# Patient Record
Sex: MALE | Race: WHITE | NOT HISPANIC OR LATINO | URBAN - METROPOLITAN AREA
[De-identification: names, ages, dates, MRNs, and addresses within clinical notes are randomized per-mention and may not be internally consistent; named-entity substitution may affect disease eponyms.]

---

## 2020-01-10 ENCOUNTER — EMERGENCY (EMERGENCY)
Facility: HOSPITAL | Age: 30
LOS: 0 days | Discharge: HOME | End: 2020-01-11
Attending: EMERGENCY MEDICINE
Payer: SELF-PAY

## 2020-01-10 VITALS — WEIGHT: 179.9 LBS | HEIGHT: 77 IN

## 2020-01-10 DIAGNOSIS — F17.210 NICOTINE DEPENDENCE, CIGARETTES, UNCOMPLICATED: ICD-10-CM

## 2020-01-10 DIAGNOSIS — R00.2 PALPITATIONS: ICD-10-CM

## 2020-01-10 DIAGNOSIS — K21.9 GASTRO-ESOPHAGEAL REFLUX DISEASE WITHOUT ESOPHAGITIS: ICD-10-CM

## 2020-01-10 LAB
ALBUMIN SERPL ELPH-MCNC: 3.8 G/DL — SIGNIFICANT CHANGE UP (ref 3.3–5)
ALP SERPL-CCNC: 95 U/L — SIGNIFICANT CHANGE UP (ref 40–120)
ALT FLD-CCNC: 24 U/L — SIGNIFICANT CHANGE UP (ref 12–78)
ANION GAP SERPL CALC-SCNC: 5 MMOL/L — SIGNIFICANT CHANGE UP (ref 5–17)
APTT BLD: 31.1 SEC — SIGNIFICANT CHANGE UP (ref 27.5–36.3)
AST SERPL-CCNC: 20 U/L — SIGNIFICANT CHANGE UP (ref 15–37)
BILIRUB SERPL-MCNC: 0.3 MG/DL — SIGNIFICANT CHANGE UP (ref 0.2–1.2)
BUN SERPL-MCNC: 17 MG/DL — SIGNIFICANT CHANGE UP (ref 7–23)
CALCIUM SERPL-MCNC: 9.3 MG/DL — SIGNIFICANT CHANGE UP (ref 8.5–10.1)
CHLORIDE SERPL-SCNC: 111 MMOL/L — HIGH (ref 96–108)
CO2 SERPL-SCNC: 26 MMOL/L — SIGNIFICANT CHANGE UP (ref 22–31)
CREAT SERPL-MCNC: 1.15 MG/DL — SIGNIFICANT CHANGE UP (ref 0.5–1.3)
GLUCOSE SERPL-MCNC: 77 MG/DL — SIGNIFICANT CHANGE UP (ref 70–99)
HCT VFR BLD CALC: 43.4 % — SIGNIFICANT CHANGE UP (ref 39–50)
HGB BLD-MCNC: 14.3 G/DL — SIGNIFICANT CHANGE UP (ref 13–17)
INR BLD: 1.1 RATIO — SIGNIFICANT CHANGE UP (ref 0.88–1.16)
LIDOCAIN IGE QN: 99 U/L — SIGNIFICANT CHANGE UP (ref 73–393)
MCHC RBC-ENTMCNC: 30.1 PG — SIGNIFICANT CHANGE UP (ref 27–34)
MCHC RBC-ENTMCNC: 32.9 GM/DL — SIGNIFICANT CHANGE UP (ref 32–36)
MCV RBC AUTO: 91.4 FL — SIGNIFICANT CHANGE UP (ref 80–100)
PCP SPEC-MCNC: SIGNIFICANT CHANGE UP
PLATELET # BLD AUTO: 161 K/UL — SIGNIFICANT CHANGE UP (ref 150–400)
POTASSIUM SERPL-MCNC: 3.9 MMOL/L — SIGNIFICANT CHANGE UP (ref 3.5–5.3)
POTASSIUM SERPL-SCNC: 3.9 MMOL/L — SIGNIFICANT CHANGE UP (ref 3.5–5.3)
PROT SERPL-MCNC: 6.7 GM/DL — SIGNIFICANT CHANGE UP (ref 6–8.3)
PROTHROM AB SERPL-ACNC: 12.2 SEC — SIGNIFICANT CHANGE UP (ref 10–12.9)
RBC # BLD: 4.75 M/UL — SIGNIFICANT CHANGE UP (ref 4.2–5.8)
RBC # FLD: 13.3 % — SIGNIFICANT CHANGE UP (ref 10.3–14.5)
SODIUM SERPL-SCNC: 142 MMOL/L — SIGNIFICANT CHANGE UP (ref 135–145)
TROPONIN I SERPL-MCNC: <0.015 NG/ML — SIGNIFICANT CHANGE UP (ref 0.01–0.04)
WBC # BLD: 9.9 K/UL — SIGNIFICANT CHANGE UP (ref 3.8–10.5)
WBC # FLD AUTO: 9.9 K/UL — SIGNIFICANT CHANGE UP (ref 3.8–10.5)

## 2020-01-10 PROCEDURE — 85730 THROMBOPLASTIN TIME PARTIAL: CPT

## 2020-01-10 PROCEDURE — 93005 ELECTROCARDIOGRAM TRACING: CPT

## 2020-01-10 PROCEDURE — 93010 ELECTROCARDIOGRAM REPORT: CPT

## 2020-01-10 PROCEDURE — 80307 DRUG TEST PRSMV CHEM ANLYZR: CPT

## 2020-01-10 PROCEDURE — 36415 COLL VENOUS BLD VENIPUNCTURE: CPT

## 2020-01-10 PROCEDURE — 85610 PROTHROMBIN TIME: CPT

## 2020-01-10 PROCEDURE — 83690 ASSAY OF LIPASE: CPT

## 2020-01-10 PROCEDURE — 84484 ASSAY OF TROPONIN QUANT: CPT

## 2020-01-10 PROCEDURE — 71046 X-RAY EXAM CHEST 2 VIEWS: CPT

## 2020-01-10 PROCEDURE — 99283 EMERGENCY DEPT VISIT LOW MDM: CPT

## 2020-01-10 PROCEDURE — 80053 COMPREHEN METABOLIC PANEL: CPT

## 2020-01-10 PROCEDURE — 85027 COMPLETE CBC AUTOMATED: CPT

## 2020-01-10 PROCEDURE — 99053 MED SERV 10PM-8AM 24 HR FAC: CPT

## 2020-01-10 PROCEDURE — 71046 X-RAY EXAM CHEST 2 VIEWS: CPT | Mod: 26

## 2020-01-10 PROCEDURE — 99283 EMERGENCY DEPT VISIT LOW MDM: CPT | Mod: 25

## 2020-01-10 RX ORDER — SODIUM CHLORIDE 9 MG/ML
1000 INJECTION INTRAMUSCULAR; INTRAVENOUS; SUBCUTANEOUS ONCE
Refills: 0 | Status: COMPLETED | OUTPATIENT
Start: 2020-01-10 | End: 2020-01-10

## 2020-01-10 RX ADMIN — SODIUM CHLORIDE 1000 MILLILITER(S): 9 INJECTION INTRAMUSCULAR; INTRAVENOUS; SUBCUTANEOUS at 23:30

## 2020-01-10 NOTE — ED ADULT TRIAGE NOTE - WEIGHT IN LBS
HOME MONITORING REPORT    INR today:   Results for orders placed or performed in visit on 07/25/18   Protime-INR   Result Value Ref Range    INR 2.80        INR Goal: 2.0-3.0    Dosing Plan  As of 7/25/2018    TTR:   88.7 % (2 mo)   Full warfarin instructions:   5 mg, then 2.5 mg repeating every 2 days              PLAN:PATIENT NOTIFIED TO  CONTINUE CURRENT DOSE AND RECHECK IN ONE WEEK. 179.8

## 2020-01-10 NOTE — ED ADULT TRIAGE NOTE - CHIEF COMPLAINT QUOTE
pt presents to Ed with complaints of palpitations. pt states symptoms began approximately 3 hrs PTA.

## 2020-01-11 VITALS
OXYGEN SATURATION: 99 % | DIASTOLIC BLOOD PRESSURE: 87 MMHG | RESPIRATION RATE: 17 BRPM | HEART RATE: 72 BPM | SYSTOLIC BLOOD PRESSURE: 133 MMHG | TEMPERATURE: 98 F

## 2020-01-11 NOTE — ED PROVIDER NOTE - OBJECTIVE STATEMENT
28 y/o male in ED c/o palpitations x years intermittent but worse today.   pt states has had 2 full cardiac w/u in the past that were normal.   pt states increase stress this week.   pt denies any fever, HA, cp, sob, n/v/d/abd pain.   tolerating PO.    no sick contacts or recent travel.

## 2020-01-11 NOTE — ED PROVIDER NOTE - PATIENT PORTAL LINK FT
You can access the FollowMyHealth Patient Portal offered by [facility] by registering at the following website: [email or web address]. By joining [employer]’s FollowMyHealth portal, you will also be able to view your health information using other applications (apps) compatible with our system.

## 2020-01-11 NOTE — ED ADULT NURSE NOTE - CHPI ED NUR SYMPTOMS NEG
no diaphoresis/no chills/no fever/no chest pain/no dizziness/no back pain/no syncope/no congestion/no nausea/no shortness of breath/no vomiting

## 2020-01-11 NOTE — ED ADULT NURSE NOTE - OBJECTIVE STATEMENT
pt c/o palpitations that began 3 hours PTA. pt endorses drinking 2 shots and 4 beers tonight @11pm. pt endorses drinking coffee today. pt denies chest pain/SOB/dizziness. on monitor throughout ER visit. pt dc to home, dc instructions given, pt verbalized understanding of dc. ambulated out of ER

## 2020-01-11 NOTE — ED PROVIDER NOTE - NSFOLLOWUPINSTRUCTIONS_ED_ALL_ED_FT
please follow up with your doctor in 2-3 days.   drink plenty of fluids.   stop smoking.    return to ED for any concerns

## 2023-03-24 ENCOUNTER — EMERGENCY (EMERGENCY)
Facility: HOSPITAL | Age: 33
LOS: 0 days | Discharge: HOME | End: 2023-03-25
Attending: EMERGENCY MEDICINE
Payer: OTHER MISCELLANEOUS

## 2023-03-24 VITALS
SYSTOLIC BLOOD PRESSURE: 148 MMHG | OXYGEN SATURATION: 100 % | RESPIRATION RATE: 18 BRPM | HEART RATE: 96 BPM | DIASTOLIC BLOOD PRESSURE: 95 MMHG | TEMPERATURE: 99 F

## 2023-03-24 VITALS — HEIGHT: 76 IN | WEIGHT: 179.9 LBS
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
## 2023-03-24 DIAGNOSIS — S99.912A UNSPECIFIED INJURY OF LEFT ANKLE, INITIAL ENCOUNTER: ICD-10-CM

## 2023-03-24 DIAGNOSIS — S99.922A UNSPECIFIED INJURY OF LEFT FOOT, INITIAL ENCOUNTER: ICD-10-CM
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
## 2023-03-24 DIAGNOSIS — M25.572 PAIN IN LEFT ANKLE AND JOINTS OF LEFT FOOT: ICD-10-CM

## 2023-03-24 DIAGNOSIS — K21.00 GASTRO-ESOPHAGEAL REFLUX DISEASE WITH ESOPHAGITIS, WITHOUT BLEEDING: ICD-10-CM

## 2023-03-24 PROCEDURE — 76376 3D RENDER W/INTRP POSTPROCES: CPT | Mod: 26

## 2023-03-24 PROCEDURE — 73610 X-RAY EXAM OF ANKLE: CPT | Mod: 26,LT

## 2023-03-24 PROCEDURE — 73630 X-RAY EXAM OF FOOT: CPT | Mod: LT

## 2023-03-24 PROCEDURE — 73630 X-RAY EXAM OF FOOT: CPT | Mod: 26,LT,77

## 2023-03-24 PROCEDURE — 99285 EMERGENCY DEPT VISIT HI MDM: CPT

## 2023-03-24 PROCEDURE — G1004: CPT

## 2023-03-24 PROCEDURE — 73590 X-RAY EXAM OF LOWER LEG: CPT | Mod: 26,LT

## 2023-03-24 PROCEDURE — 73700 CT LOWER EXTREMITY W/O DYE: CPT | Mod: MG,LT

## 2023-03-24 PROCEDURE — 76376 3D RENDER W/INTRP POSTPROCES: CPT

## 2023-03-24 PROCEDURE — 73590 X-RAY EXAM OF LOWER LEG: CPT | Mod: LT

## 2023-03-24 PROCEDURE — 73700 CT LOWER EXTREMITY W/O DYE: CPT | Mod: 26,LT,MG

## 2023-03-24 PROCEDURE — 73610 X-RAY EXAM OF ANKLE: CPT | Mod: LT

## 2023-03-24 PROCEDURE — 99284 EMERGENCY DEPT VISIT MOD MDM: CPT | Mod: 25

## 2023-03-24 PROCEDURE — 73610 X-RAY EXAM OF ANKLE: CPT | Mod: 26,LT,77

## 2023-03-24 PROCEDURE — 73630 X-RAY EXAM OF FOOT: CPT | Mod: 26,LT

## 2023-03-24 RX ORDER — OXYCODONE AND ACETAMINOPHEN 5; 325 MG/1; MG/1
1 TABLET ORAL ONCE
Refills: 0 | Status: DISCONTINUED | OUTPATIENT
Start: 2023-03-24 | End: 2023-03-24

## 2023-03-24 RX ADMIN — OXYCODONE AND ACETAMINOPHEN 1 TABLET(S): 5; 325 TABLET ORAL at 20:49

## 2023-03-24 NOTE — ED STATDOCS - NS ED ATTENDING STATEMENT MOD
This was a shared visit with the NIRMAL. I reviewed and verified the documentation and independently performed the documented:

## 2023-03-24 NOTE — ED STATDOCS - CLINICAL SUMMARY MEDICAL DECISION MAKING FREE TEXT BOX
Plan: Xr, pain meds. pt s/p left ankle trauma when foot went into pothole.   visible deformity and swelling.   Plan: Xr, pain meds.

## 2023-03-24 NOTE — ED STATDOCS - CARE PROVIDER_API CALL
Flaquito Roe (DO)  Orthopaedic Surgery  [address] [place] [address]  Phone: [phone]  Fax: [phone]  Follow Up Time:

## 2023-03-24 NOTE — ED STATDOCS - NSFOLLOWUPINSTRUCTIONS_ED_ALL_ED_FT
Ankle Fracture       The ankle joint is made up of the lower (distal) sections of the lower leg bones, called the tibia and fibula, along with a bone in the foot called the talus. An ankle fracture is a break in one, two, or all three of these sections of bone. There are two general types of ankle fractures:  •Stable fracture. This happens when one of the bones is broken, but the bones of the ankle joint stay in their normal positions.      •Unstable fracture. This type can include more than one broken bone. It can also happen if the outer bone is broken and the strong tissues that connect bones to each other (ligaments) are also injured at the inner ankle. This type of fracture allows the talus to move out of its normal position.        What are the causes?    This condition may be caused by:  •A hard, direct hit to the ankle.      •Quickly and severely twisting your ankle, often while your foot is planted and the rest of your body is moving.      •Trauma, such as from a car crash or a fall from a height.        What increases the risk?    The following factors may make you more likely to develop this condition:  •Being overweight.      •Participating in sports that involve quick direction changes, as in soccer.      •Doing high-impact sports such as gymnastics or football.        What are the signs or symptoms?     Symptoms of this condition include:  •A tender and swollen ankle.      •Bruising around your injured ankle.      •Pain when moving or pressing on your ankle.      •Trouble walking or using your ankle to support your body weight (putting weight on your ankle).      •Pain that gets worse when you move your foot or ankle or when you stand.      •Pain that gets better with rest.        How is this diagnosed?    An ankle fracture is usually diagnosed with a physical exam and X-rays. You may also have a CT scan or an MRI.      How is this treated?    Treatment for this condition depends on the type of ankle fracture you have. Stable fractures are treated with a cast, boot, or splint to hold the ankle still and crutches to avoid putting weight on the ankle until the fracture heals. Unstable fractures require surgery to ensure that the bones heal properly. After surgery, you will have a splint. After your incision has healed, your surgeon may give you a cast or a boot. You will not be able to put weight on your injured side for several weeks.    After your ankle has healed, you will do physical therapy exercises to improve movement and strength in your ankle.      Follow these instructions at home:    If you have a boot or splint:     •Wear the boot or splint as told by your health care provider. Remove it only as told by your health care provider.      •Loosen it if your toes tingle, become numb, or turn cold and blue.      •Keep it clean and dry.      If you have a cast:     • Do not put pressure on any part of the cast until it is fully hardened. This may take several hours.      • Do not stick anything inside the cast to scratch your skin. Doing that increases your risk of infection.      •Check the skin around the cast every day. Tell your health care provider about any concerns.      •You may put lotion on dry skin around the edges of the cast. Do not put lotion on the skin underneath the cast.      •Keep it clean and dry.      Bathing     • Do not take baths, swim, or use a hot tub until your health care provider approves. Ask your health care provider if you may take showers. You may only be allowed to take sponge baths.    •If the cast, boot, or splint is not waterproof:  •Do not let it get wet.      •Cover it with a watertight covering when you take a bath or shower.          Managing pain, stiffness, and swelling    •If directed, put ice on the injured area. To do this:  •If you have a removable splint or boot, remove it as told by your health care provider.      •Put ice in a plastic bag.      •Place a towel between your skin and the bag or between your cast and the bag.      •Leave the ice on for 20 minutes, 2–3 times a day.      •Remove the ice if your skin turns bright red. This is very important. If you cannot feel pain, heat, or cold, you have a greater risk of damage to the area.        •Move your toes often to reduce stiffness and swelling.      •Raise (elevate) the injured area above the level of your heart while you are sitting or lying down.      Activity     •Do exercises as told by your health care provider.      •Return to your normal activities as told by your health care provider. Ask your health care provider what activities are safe for you.      • Do not use the injured limb to support your body weight until your health care provider says that you can. Use crutches as told by your health care provider.      General instructions     •Take over-the-counter and prescription medicines only as told by your health care provider.      •Ask your health care provider when it is safe to drive if you have a cast, boot, or splint on your ankle.      • Do not use any products that contain nicotine or tobacco, such as cigarettes, e-cigarettes, and chewing tobacco. These can delay bone healing. If you need help quitting, ask your health care provider.      •Keep all follow-up visits. This is important.        Contact a health care provider if:    •You have pain or swelling that gets worse or does not get better with rest or medicine.      •Your cast gets damaged.        Get help right away if:    •You have severe pain that lasts.      •You develop new pain or swelling.      •Your skin or toenails below the injury turn blue or gray, feel cold, become numb, or are less sensitive to the touch.        Summary    •An ankle fracture can be stable or unstable. This is determined after a physical exam and imaging studies such as X-rays, a CT scan, or an MRI.      •Stable fractures are treated with a cast, boot, or splint to hold the ankle still until the fracture heals. Unstable fractures require surgery to ensure that the bones heal properly.      •You will not be able to put weight on your injured side for several weeks.      •Medicines, icing, and raising (elevating) your injured ankle when you are sitting or lying down may help with pain relief. Follow instructions as told by your health care provider.      This information is not intended to replace advice given to you by your health care provider. Make sure you discuss any questions you have with your health care provider.

## 2023-03-24 NOTE — ED STATDOCS - OBJECTIVE STATEMENT
34 y/o M with a pMhx of GERD with esophagitis presents to the ED c/o L ankle pain and swelling. pt is a [employer] worker that stepped out of truck into a pothole. Inverted L foot, c/o pain, swelling, and inability to bear weight x 6 hrs. No pain meds taken, able to feel and able to move toes. Denies any other complaints.

## 2023-03-24 NOTE — ED STATDOCS - ATTENDING APP SHARED VISIT CONTRIBUTION OF CARE
I, Lexx Adan, performed the initial face to face bedside interview with this patient regarding history of present illness, review of symptoms and relevant past medical, social and family history.  I completed an independent physical examination.  I was the initial provider who evaluated this patient. I have signed out the follow up of any pending tests (i.e. labs, radiological studies) to the NIRMAL.  I have communicated the patient’s plan of care and disposition with the NIRMAL.  The history, relevant review of systems, past medical and surgical history, medical decision making, and physical examination was documented by the scribe in my presence and I attest to the accuracy of the documentation.     pt with left ankle trauma.  states stepped off [employer] truck into pothole leading to left foot inversion.   visible deformity and swelling to left ankle.    NVID.   will XR, meds and reeval

## 2023-03-24 NOTE — ED ADULT NURSE NOTE - OBJECTIVE STATEMENT
Pt presents to ED for a left ankle injury. States he was at work and fell off of a loading dock, falling about six feet and landing into an approx 8" drainage ditch, inverting left ankle. Ankle swollen with obvious deformity. skin warm and dry. +DP/PT pulses. xrays performed and percocet given. will continue to monitor awaiting treatment, dispo Pt presents to ED for a left ankle injury. States he was at work and fell off of a loading dock, falling about six feet and landing into an approx 8" drainage ditch, inverting left ankle. Ankle swollen with obvious deformity. skin warm and dry. +PT pulses, cap refill <2sec, unable to assess PT pulses due to swelling and pain. xrays performed and percocet given. will continue to monitor awaiting treatment, dispo

## 2023-03-26 PROBLEM — K21.9 GASTRO-ESOPHAGEAL REFLUX DISEASE WITHOUT ESOPHAGITIS: Chronic | Status: ACTIVE | Noted: 2020-01-11

## 2023-03-31 ENCOUNTER — NON-APPOINTMENT (OUTPATIENT)
Age: 33
End: 2023-03-31

## 2023-03-31 ENCOUNTER — APPOINTMENT (OUTPATIENT)
Dept: ORTHOPEDIC SURGERY | Facility: CLINIC | Age: 33
End: 2023-03-31
Payer: OTHER MISCELLANEOUS

## 2023-03-31 VITALS — WEIGHT: 180 LBS | HEIGHT: 76 IN | BODY MASS INDEX: 21.92 KG/M2

## 2023-03-31 PROCEDURE — 99204 OFFICE O/P NEW MOD 45 MIN: CPT

## 2023-03-31 PROCEDURE — 27760 CLTX MEDIAL ANKLE FX: CPT | Mod: LT

## 2023-03-31 PROCEDURE — 28430 CLTX TALUS FRACTURE W/O MNPJ: CPT | Mod: LT

## 2023-04-05 NOTE — ADDENDUM
[FreeTextEntry1] : I, Martin Sims, acted solely as a scribe for Dr. Flaquito Roe DO on this date 03/31/2023  .\par  \par All medical record entries made by the Scribe were at my, Dr. Flaquito Roe DO, direction and personally dictated by me on 03/31/2023 . I have reviewed the chart and agree that the record accurately reflects my personal performance of the history, physical exam, assessment and plan. I have also personally directed, reviewed, and agreed with the chart.

## 2023-04-05 NOTE — PHYSICAL EXAM
[de-identified] : General: Alert and oriented x3. In no acute distress. Pleasant in nature with a normal affect. No apparent respiratory distress.\par \par Limited due to injury - diffuse swelling, ecchymosis medial malleolus.\par \par Left Ankle Exam\par Skin: Clean, dry, intact\par Inspection: No obvious malalignment, no swelling, no effusion; no lymphadenopathy\par Pulses: 2+ DP/PT pulses\par ROM: 10 degrees of dorsiflexion, 40 degrees of plantarflexion, 35 degrees of Eversion, 35 degrees of Inversion; 10 degrees of subtalar motion.\par Tenderness: No tenderness over the lateral malleolus, no CFL/no ATFL/no PTFL pain. No medial malleolus pain, no deltoid ligament pain. No proximal fibular pain. No heel pain.\par Stability: Negative anterior/posterior drawer.\par Strength: 5/5 TA/GS/EHL\par Neuro: In tact to light touch throughout\par Additional tests: Negative Mortons test, Negative syndesmosis squeeze test. Negative Martha's Sign. [de-identified] : ACC: [id number] EXAM: CT 3D RECONSTRUCT WO CHRISTIANA ORDERED BY: BEBA CHONG\par \par ACC: [id number] EXAM: CT ANKLE ONLY LT ORDERED BY: BEBA CHONG\par \par PROCEDURE DATE: 03/24/2023\par \par \par INTERPRETATION: CLINICAL INFORMATION: Trauma and twisted ankle.\par \par TECHNIQUE: CT of the left ankle was performed in bone and soft tissue windows with coronal and sagittal reformats. No intravenous contrast was administered. 3-D reformats were performed on a separate workstation.\par \par COMPARISON: No similar prior studies are available for comparison.\par \par FINDINGS:\par \par Bone: Acute intra-articular avulsion fractures of the anterolateral aspect of the medial malleolus is seen with mild displacement of some fracture fragments. Acute mildly displaced avulsion fractures are noted emanating from the anterior aspect of the medial tibial plafond. An acute comminuted, intra-articular fracture of the medial aspect of the talus is seen with mild lateral depression of the fracture fragments and fracture extension to the medial aspect of the posterior subtalar joint. An acute nondisplaced intra-articular fracture of the inferomedial aspect of the cuboid is noted. No additional fracture or dislocation is demonstrated.\par \par Soft tissues: Marked soft tissue swelling and subcutaneous inflammatory change is seen in the lateral ankle extending into the distal lower leg and lateral hindfoot and midfoot. Mild soft tissue swelling and subcutaneous inflammatory change is noted in the anterior ankle and medial hindfoot.\par \par IMPRESSION:\par \par 1. Acute mildly displaced avulsion fractures of the anterolateral aspect of the left medial malleolus and anterior aspect of the medial left tibial plafond.\par 2. Acute comminuted and depressed, intra-articular fracture of the medial aspect of the left talus with fracture extension to the medial aspect of the posterior subtalar joint.\par 3. Acute nondisplaced intra-articular fracture of the inferomedial aspect of the left cuboid.\par \par --- End of Report ---\par \par CURTIS LINDQUIST MD; Attending Radiologist\par This document has been electronically signed. Mar 24 2023 10:27PM

## 2023-04-05 NOTE — HISTORY OF PRESENT ILLNESS
[FreeTextEntry1] : Patient is a 33 year old, right hand dominant male who presents in office status post WRA left ankle on 3/24/2023. The patient works for [employer] and a . The patient states that he fell off a loading dock about 3 feet high. As he was going up the dock, a [place] lunged at his face. As the dog lunged at his face he let go and rolled his left ankle. He went to  ER that night where he obtained XR imaging. He was discharged that same day. He endorses global ankle swelling and pain. He does have some numbness in his toes. He presents in a splint and is ambulating without assistance. No other complaint.\par \par Driving: Not currently driving\par Working: he is currently out of work at this time.\par \par Pain scale: 8/10\par \par Activities that make the pain better:\par Rest\par Ice\par Heat\par \par Activities that make pain worse:\par ADL's\par Lifting heavy objects\par Chores\par Work\par Sleeping - lying down\par Seating to standing position\par Walking\par Stairs

## 2023-04-05 NOTE — DISCUSSION/SUMMARY
[de-identified] : Today I had a lengthy discussion with the patient regarding their right ankle pain. I have addressed all the patient's concerns surrounding the pathology of their condition. CT results interpreted by me and reviewed with the patient. At this time, we will proceed with conservative treatment. \par \par I recommended that the patient utilize a CAM boot, non weightbearing, crutches. The patient was fitted for the CAM boot in the office today. The patient was educated about the boot wear pattern and utilization, as well as the timeframe to come out of the boot. He was also given full instructions for using the boot. I advised the patient to utilize 325 mg of Aspirin as instructed for blood thinning purposes. The prescription for the Aspirin was provided for the patient in the office today. I recommend that the patient utilize ice, heat, NSAIDs prn. They can also elevate their right ankle above the level of the heart. I encouraged the patient to quit smoking and educated them about the risks that smoking has on bone healing. \par \par I would like to see the patient back in the office in 4 -6 weeks to reassess their condition. \par \par Patient is 100% temporarily disabled out of work until further evaluation. \par \par The patient understood and verbally agreed to the treatment plan. All of their questions were answered and they were satisfied with the visit. The patient should call the office if they have any questions or experience worsening symptoms.

## 2023-04-21 ENCOUNTER — APPOINTMENT (OUTPATIENT)
Dept: ORTHOPEDIC SURGERY | Facility: CLINIC | Age: 33
End: 2023-04-21
Payer: OTHER MISCELLANEOUS

## 2023-04-21 PROCEDURE — 99024 POSTOP FOLLOW-UP VISIT: CPT

## 2023-04-21 PROCEDURE — 73610 X-RAY EXAM OF ANKLE: CPT | Mod: LT

## 2023-04-21 NOTE — PHYSICAL EXAM
[de-identified] : General: Alert and oriented x3. In no acute distress. Pleasant in nature with a normal affect. No apparent respiratory distress.\par \par Limited due to injury - diffuse swelling, ecchymosis medial malleolus.\par \par Left Ankle Exam\par Skin: Clean, dry, intact\par Inspection: No obvious malalignment, + swelling, no effusion; no lymphadenopathy\par Pulses: 2+ DP/PT pulses\par ROM: 10 degrees of dorsiflexion, 40 degrees of plantarflexion, 35 degrees of Eversion, 35 degrees of Inversion; 10 degrees of subtalar motion.\par Tenderness: No tenderness over the lateral malleolus, no CFL/no ATFL/no PTFL pain. + medial malleolus pain, no deltoid ligament pain. No proximal fibular pain. No heel pain.\par Stability: Negative anterior/posterior drawer.\par Strength: 5/5 TA/GS/EHL\par Neuro: In tact to light touch throughout\par Additional tests: Negative Mortons test, Negative syndesmosis squeeze test. Negative Martha's Sign.\par \par *Positive pain anterior joint line of the ankle tibiotalar joint and pain along the medial talus to palpation. [de-identified] : ACC: [id number] EXAM: CT 3D RECONSTRUCT WO [place]RISTIANA ORDERED BY: BEBA CHONG\par \par ACC: [id number] EXAM: CT ANKLE ONLY LT ORDERED BY: BEBA CHONG\par \par PROCEDURE DATE: 03/24/2023\par \par \par INTERPRETATION: CLINICAL INFORMATION: Trauma and twisted ankle.\par \par TECHNIQUE: CT of the left ankle was performed in bone and soft tissue windows with coronal and sagittal reformats. No intravenous contrast was administered. 3-D reformats were performed on a separate workstation.\par \par COMPARISON: No similar prior studies are available for comparison.\par \par FINDINGS:\par \par Bone: Acute intra-articular avulsion fractures of the anterolateral aspect of the medial malleolus is seen with mild displacement of some fracture fragments. Acute mildly displaced avulsion fractures are noted emanating from the anterior aspect of the medial tibial plafond. An acute comminuted, intra-articular fracture of the medial aspect of the talus is seen with mild lateral depression of the fracture fragments and fracture extension to the medial aspect of the posterior subtalar joint. An acute nondisplaced intra-articular fracture of the inferomedial aspect of the cuboid is noted. No additional fracture or dislocation is demonstrated.\par \par Soft tissues: Marked soft tissue swelling and subcutaneous inflammatory change is seen in the lateral ankle extending into the distal lower leg and lateral hindfoot and midfoot. Mild soft tissue swelling and subcutaneous inflammatory change is noted in the anterior ankle and medial hindfoot.\par \par IMPRESSION:\par \par 1. Acute mildly displaced avulsion fractures of the anterolateral aspect of the left medial malleolus and anterior aspect of the medial left tibial plafond.\par 2. Acute comminuted and depressed, intra-articular fracture of the medial aspect of the left talus with fracture extension to the medial aspect of the posterior subtalar joint.\par 3. Acute nondisplaced intra-articular fracture of the inferomedial aspect of the left cuboid.\par \par --- End of Report ---\par \par CURTIS LINDQUIST MD; Attending Radiologist\par This document has been electronically signed. Mar 24 2023 10:27PM

## 2023-04-21 NOTE — DISCUSSION/SUMMARY
[de-identified] : At this time I want the patient to start outpatient physical therapy in 1 week.  A physical therapy prescription was provided to the patient to begin physical therapy for his left ankle.  He can start to slowly come off the 2 crutches and he can now slowly weight-bear as tolerated with a long cam walking boot continuing to protect the ankle.  While in physical therapy he will start to work on full range of motion of the ankle, strength, swelling control and modalities.  He will continue with aspirin for DVT prophylaxis until he is out of the boot completely.  He will continue with RICE therapy for swelling control.  He will continue with NSAIDs and Tylenol as needed for pain.  The patient will remain 100% temporary disabled from returning to work due to his left ankle injury and fractures.  I do want to see the patient back here in 4 to 6 weeks to see if there is improvement.  Hopefully by then he is out of the boot and into an ASO brace with supportive sneakers.  If he is doing better then, consider return to work then.  All of his questions were answered and he understood and agreed to the treatment course.

## 2023-04-21 NOTE — HISTORY OF PRESENT ILLNESS
[FreeTextEntry1] : 4/21/23: The patient is here for a follow-up of his left ankle injury, work-related accident.  The patient continues to use crutches and wear his long cam boot, partial weightbearing.  He does continue to have left ankle pain but states that the pain has improved.  The patient has not started outpatient physical therapy due to the ankle injury and fracture sustained during the injury.  The patient is currently out of work due to his injury.  No other complaints.  Pain scale left ankle 4 out of 10.  Patient not currently driving at this time.\par \par 3/31/23: Patient is a 33 year old, right hand dominant male who presents in office status post WRA left ankle on 3/24/2023. The patient works for [employer] and a . The patient states that he fell off a loading dock about 3 feet high. As he was going up the dock, a [place] lunged at his face. As the dog lunged at his face he let go and rolled his left ankle. He went to  ER that night where he obtained XR imaging. He was discharged that same day. He endorses global ankle swelling and pain. He does have some numbness in his toes. He presents in a splint and is ambulating without assistance. No other complaint.\par \par Driving: Not currently driving\par Working: he is currently out of work at this time.\par \par Pain scale: 8/10\par \par Activities that make the pain better:\par Rest\par Ice\par Heat\par \par Activities that make pain worse:\par ADL's\par Lifting heavy objects\par Chores\par Work\par Sleeping - lying down\par Seating to standing position\par Walking\par Stairs

## 2023-06-01 ENCOUNTER — APPOINTMENT (OUTPATIENT)
Dept: ORTHOPEDIC SURGERY | Facility: CLINIC | Age: 33
End: 2023-06-01
Payer: OTHER MISCELLANEOUS

## 2023-06-01 ENCOUNTER — FORM ENCOUNTER (OUTPATIENT)
Age: 33
End: 2023-06-01

## 2023-06-01 PROCEDURE — 99024 POSTOP FOLLOW-UP VISIT: CPT

## 2023-06-01 PROCEDURE — 73610 X-RAY EXAM OF ANKLE: CPT | Mod: LT

## 2023-06-01 NOTE — PHYSICAL EXAM
[de-identified] : General: Alert and oriented x3. In no acute distress. Pleasant in nature with a normal affect. No apparent respiratory distress.\par \par Limited due to injury - diffuse swelling, ecchymosis medial malleolus.\par \par Left Ankle Exam\par Skin: Clean, dry, intact\par Inspection: No obvious malalignment, + swelling, no effusion; no lymphadenopathy\par Pulses: 2+ DP/PT pulses\par ROM: 10 degrees of dorsiflexion, 40 degrees of plantarflexion, 35 degrees of Eversion, 35 degrees of Inversion; 10 degrees of subtalar motion.\par Tenderness: TTP Medial talus. No tenderness over the lateral malleolus, no CFL/no ATFL/no PTFL pain. No medial malleolus pain, no deltoid ligament pain. No proximal fibular pain. No heel pain.\par Stability: Negative anterior/posterior drawer.\par Strength: 5/5 TA/GS/EHL\par Neuro: In tact to light touch throughout\par Additional tests: Negative Mortons test, Negative syndesmosis squeeze test. Negative Martha's Sign.\par \par *Positive pain anterior joint line of the ankle tibiotalar joint and pain along the medial talus to palpation. [de-identified] : 3V of the left ankle were ordered obtained and reviewed by me today, 06/01/2023 , revealed: joint space intact.

## 2023-06-01 NOTE — HISTORY OF PRESENT ILLNESS
[FreeTextEntry1] : 06/01/2023: The patient is a 33 year old male presenting to the office for a follow up evaluation of his left ankle injury, work related accident. He has been compliant with physical therapy with relief. His symptoms have mildly improved since his last visit, but he is still having soreness, stiffness and sharp pain in his ankle. He reports that he has increased pain with inversion and eversion ROM. He presents to the office in sneakers and ambulating without assistance. \par \par Patient is no working.\par Patient is driving. \par \par 4/21/23: The patient is here for a follow-up of his left ankle injury, work-related accident.  The patient continues to use crutches and wear his long cam boot, partial weightbearing.  He does continue to have left ankle pain but states that the pain has improved.  The patient has not started outpatient physical therapy due to the ankle injury and fracture sustained during the injury.  The patient is currently out of work due to his injury.  No other complaints.  Pain scale left ankle 4 out of 10.  Patient not currently driving at this time.\par \par 3/31/23: Patient is a 33 year old, right hand dominant male who presents in office status post WRA left ankle on 3/24/2023. The patient works for [employer] and a . The patient states that he fell off a loading dock about 3 feet high. As he was going up the dock, a [place]te lunged at his face. As the dog lunged at his face he let go and rolled his left ankle. He went to  [facility] that night where he obtained XR imaging. He was discharged that same day. He endorses global ankle swelling and pain. He does have some numbness in his toes. He presents in a splint and is ambulating without assistance. No other complaint.\par \par Driving: Not currently driving\par Working: he is currently out of work at this time.\par \par Pain scale: 8/10\par \par Activities that make the pain better:\par Rest\par Ice\par Heat\par \par Activities that make pain worse:\par ADL's\par Lifting heavy objects\par Chores\par Work\par Sleeping - lying down\par Seating to standing position\par Walking\par Stairs

## 2023-06-01 NOTE — ADDENDUM
[FreeTextEntry1] : I, ESTELA JHLANEY, acted solely as a scribe for Dr. Flaquito Roe on this date 06/01/2023  .\par  \par All medical record entries made by the Scribe were at my, Dr. Flaquito Roe, direction and personally dictated by me on 06/01/2023 . I have reviewed the chart and agree that the record accurately reflects my personal performance of the history, physical exam, assessment and plan. I have also personally directed, reviewed, and agreed with the chart.

## 2023-06-01 NOTE — DISCUSSION/SUMMARY
[de-identified] : Today I had a lengthy discussion with the patient regarding their left ankle work-related injury. I have addressed all the patient's concerns surrounding the pathology of their condition. I reviewed the patient's XR imaging and previous CT scan with him in great detail. He will continue with RICE therapy for swelling control. He will continue with NSAIDs and Tylenol as needed for pain. \par \par At this time I would like to obtain advanced imaging of the patient's left ankle. A CT scan was ordered so I can find out more about the etiology of the patient's condition and evaluate the talus fracture. The patient should follow up with the office after obtaining the CT scan. \par \par The patient will remain 100% temporarily disabled and out of work until his next visit with CT scan results.\par \par The patient understood and verbally agreed to the treatment plan. All of their questions were answered and they were satisfied with the visit. The patient should call the office if they have any questions or experience worsening symptoms.

## 2023-06-11 ENCOUNTER — FORM ENCOUNTER (OUTPATIENT)
Age: 33
End: 2023-06-11

## 2023-06-22 ENCOUNTER — APPOINTMENT (OUTPATIENT)
Dept: ORTHOPEDIC SURGERY | Facility: CLINIC | Age: 33
End: 2023-06-22
Payer: OTHER MISCELLANEOUS

## 2023-06-22 PROCEDURE — 99213 OFFICE O/P EST LOW 20 MIN: CPT | Mod: 24

## 2023-06-22 NOTE — HISTORY OF PRESENT ILLNESS
[Has the patient missed work because of the injury/illness?] : The patient has missed work because of the injury/illness. [FreeTextEntry1] : 06/22/2023: The patient is a 33 year old male presenting to the office for a follow up evaluation of a left ankle, work related injury. He notes that the CT scan was denied by Worker's Compensation.  The patient continues with PT which he states is helping with the strength but he still complains of pain left ankle.  Patient is walking in sneakers without assistance.  \par \par Patient is not working.\par Patient is driving. \par \par Pain scale: 4/10\par \par Activities that make the pain better:\par Rest\par Ice\par Heat\par \par Activities that make pain worse:\par ADL's\par Lifting heavy objects\par Chores\par Work\par Sleeping - lying down\par Seating to standing position\par Walking\par Stairs\par \par 06/01/2023: The patient is a 33 year old male presenting to the office for a follow up evaluation of his left ankle injury, work related accident. He has been compliant with physical therapy with relief. His symptoms have mildly improved since his last visit, but he is still having soreness, stiffness and sharp pain in his ankle. He reports that he has increased pain with inversion and eversion ROM. He presents to the office in sneakers and ambulating without assistance. \par \par Patient is no working.\par Patient is driving. \par \par 4/21/23: The patient is here for a follow-up of his left ankle injury, work-related accident.  The patient continues to use crutches and wear his long cam boot, partial weightbearing.  He does continue to have left ankle pain but states that the pain has improved.  The patient has not started outpatient physical therapy due to the ankle injury and fracture sustained during the injury.  The patient is currently out of work due to his injury.  No other complaints.  Pain scale left ankle 4 out of 10.  Patient not currently driving at this time.\par \par 3/31/23: Patient is a 33 year old, right hand dominant male who presents in office status post WRA left ankle on 3/24/2023. The patient works for [employer] and a . The patient states that he fell off a loading dock about 3 feet high. As he was going up the dock, a [place] Almonte lunged at his face. As the dog lunged at his face he let go and rolled his left ankle. He went to  [facility] that night where he obtained XR imaging. He was discharged that same day. He endorses global ankle swelling and pain. He does have some numbness in his toes. He presents in a splint and is ambulating without assistance. No other complaint.\par \par Driving: Not currently driving\par Working: he is currently out of work at this time.\par \par Pain scale: 8/10\par \par Activities that make the pain better:\par Rest\par Ice\par Heat\par \par Activities that make pain worse:\par ADL's\par Lifting heavy objects\par Chores\par Work\par Sleeping - lying down\par Seating to standing position\par Walking\par Stairs

## 2023-06-22 NOTE — PHYSICAL EXAM
[de-identified] : General: Alert and oriented x3. In no acute distress. Pleasant in nature with a normal affect. No apparent respiratory distress.\par \par Limited due to injury - diffuse swelling, ecchymosis medial malleolus.\par \par Left Ankle Exam\par Skin: Clean, dry, intact\par Inspection: No obvious malalignment, mild swelling, no effusion; no lymphadenopathy\par Pulses: 2+ DP/PT pulses\par ROM: 10 degrees of dorsiflexion, 40 degrees of plantarflexion, 20 degrees of Eversion, 30 degrees of Inversion; 10 degrees of subtalar motion.\par Tenderness: TTP Medial talus. No tenderness over the lateral malleolus, no CFL/no ATFL/no PTFL pain. No medial malleolus pain, no deltoid ligament pain. No proximal fibular pain. No heel pain.\par Stability: Negative anterior/posterior drawer.\par Strength: 5/5 TA/GS/EHL\par Neuro: In tact to light touch throughout\par Additional tests: Negative Mortons test, Negative syndesmosis squeeze test. Negative Martha's Sign.\par \par *Positive pain improved anterior joint line of the ankle tibiotalar joint and pain along the medial talus to palpation. [de-identified] : No x-rays performed today.

## 2023-06-22 NOTE — DISCUSSION/SUMMARY
[de-identified] : Today I had a lengthy discussion with the patient regarding their left ankle work-related injury. I have addressed all the patient's concerns surrounding the pathology of their condition.  No additional imaging will be ordered at this time as the CAT scan was denied by Worker's Compensation.  The patient does state that physical therapy and conservative management is helping with the strength of his ankle, I do recommend him continuing with physical therapy for left ankle strengthening program.  The patient can use an over-the-counter ankle sleeve or brace as needed. He will continue with RICE therapy for swelling control. He will continue with NSAIDs and Tylenol as needed for pain. \par \par The patient states that he continues to have pain with stiffness.  Based on this the patient will remain 100% temporarily disabled and continue to be  out of work because of the continued pain.  The patient will perform 4 more weeks of physical therapy and return to office for a follow-up.  At that time we will reassess the ankle.\par \par The patient understood and verbally agreed to the treatment plan. All of their questions were answered and they were satisfied with the visit. The patient should call the office if they have any questions or experience worsening symptoms.

## 2023-07-24 ENCOUNTER — APPOINTMENT (OUTPATIENT)
Dept: ORTHOPEDIC SURGERY | Facility: CLINIC | Age: 33
End: 2023-07-24
Payer: OTHER MISCELLANEOUS

## 2023-07-24 PROCEDURE — 99213 OFFICE O/P EST LOW 20 MIN: CPT

## 2023-07-24 NOTE — HISTORY OF PRESENT ILLNESS
[Has the patient missed work because of the injury/illness?] : The patient has missed work because of the injury/illness. [FreeTextEntry1] : 7/24/23: JOSEY GREEN is a 33 year old male who presents for follow up evaluation of left ankle, work related injury. He states that his pain has improved since the last visit. He presents today wearing sneakers and is ambulating without assistance. \par He is currently not working.\par \par 06/22/2023: The patient is a 33 year old male presenting to the office for a follow up evaluation of a left ankle, work related injury. He notes that the CT scan was denied by Worker's Compensation.  The patient continues with PT which he states is helping with the strength but he still complains of pain left ankle.  Patient is walking in sneakers without assistance.  \par \par Patient is not working.\par Patient is driving. \par \par Pain scale: 4/10\par \par Activities that make the pain better:\par Rest\par Ice\par Heat\par \par Activities that make pain worse:\par ADL's\par Lifting heavy objects\par Chores\par Work\par Sleeping - lying down\par Seating to standing position\par Walking\par Stairs\par \par 06/01/2023: The patient is a 33 year old male presenting to the office for a follow up evaluation of his left ankle injury, work related accident. He has been compliant with physical therapy with relief. His symptoms have mildly improved since his last visit, but he is still having soreness, stiffness and sharp pain in his ankle. He reports that he has increased pain with inversion and eversion ROM. He presents to the office in sneakers and ambulating without assistance. \par \par Patient is no working.\par Patient is driving. \par \par 4/21/23: The patient is here for a follow-up of his left ankle injury, work-related accident.  The patient continues to use crutches and wear his long cam boot, partial weightbearing.  He does continue to have left ankle pain but states that the pain has improved.  The patient has not started outpatient physical therapy due to the ankle injury and fracture sustained during the injury.  The patient is currently out of work due to his injury.  No other complaints.  Pain scale left ankle 4 out of 10.  Patient not currently driving at this time.\par \par 3/31/23: Patient is a 33 year old, right hand dominant male who presents in office status post WRA left ankle on 3/24/2023. The patient works for [employer] and a . The patient states that he fell off a loading dock about 3 feet high. As he was going up the dock, a [place] Almonte lunged at his face. As the dog lunged at his face he let go and rolled his left ankle. He went to  [facility] that night where he obtained XR imaging. He was discharged that same day. He endorses global ankle swelling and pain. He does have some numbness in his toes. He presents in a splint and is ambulating without assistance. No other complaint.\par \par Driving: Not currently driving\par Working: he is currently out of work at this time.\par \par Pain scale: 8/10\par \par Activities that make the pain better:\par Rest\par Ice\par Heat\par \par Activities that make pain worse:\par ADL's\par Lifting heavy objects\par Chores\par Work\par Sleeping - lying down\par Seating to standing position\par Walking\par Stairs

## 2023-07-24 NOTE — PHYSICAL EXAM
[de-identified] : General: Alert and oriented x3. In no acute distress. Pleasant in nature with a normal affect. No apparent respiratory distress.\par \par Left Ankle Exam\par Skin: Clean, dry, intact\par Inspection: No obvious malalignment, mild swelling, no effusion; no lymphadenopathy\par Pulses: 2+ DP/PT pulses\par ROM: 10 degrees of dorsiflexion, 40 degrees of plantarflexion, 20 degrees of Eversion, 30 degrees of Inversion; 10 degrees of subtalar motion.\par Tenderness: improved TTP Medial talus. No tenderness over the lateral malleolus, no CFL/no ATFL/no PTFL pain. No medial malleolus pain, no deltoid ligament pain. No proximal fibular pain. No heel pain.\par Stability: Negative anterior/posterior drawer.\par Strength: 5/5 TA/GS/EHL\par Neuro: In tact to light touch throughout\par Additional tests: Negative Mortons test, Negative syndesmosis squeeze test. Negative Martha's Sign.\par \par *Positive pain improved anterior joint line of the ankle tibiotalar joint and pain along the medial talus to palpation. [de-identified] : No x-rays performed today.

## 2023-07-24 NOTE — ADDENDUM
[FreeTextEntry1] : I, NICOLETTE FUNK, acted solely as a scribe for Dr. Flaquito Roe on this date 07/24/2023  .\par  \par All medical record entries made by the Scribe were at my, Dr. Flaquito Roe, direction and personally dictated by me on 07/24/2023 . I have reviewed the chart and agree that the record accurately reflects my personal performance of the history, physical exam, assessment and plan. I have also personally directed, reviewed, and agreed with the chart.

## 2023-08-02 ENCOUNTER — APPOINTMENT (OUTPATIENT)
Dept: ORTHOPEDIC SURGERY | Facility: CLINIC | Age: 33
End: 2023-08-02
Payer: OTHER MISCELLANEOUS

## 2023-08-02 PROCEDURE — 99213 OFFICE O/P EST LOW 20 MIN: CPT

## 2023-08-02 NOTE — PHYSICAL EXAM
[de-identified] : General: Alert and oriented x3. In no acute distress. Pleasant in nature with a normal affect. No apparent respiratory distress.\par  \par  Left Ankle Exam\par  Skin: Clean, dry, intact\par  Inspection: No obvious malalignment, mild swelling, no effusion; no lymphadenopathy\par  Pulses: 2+ DP/PT pulses\par  ROM: 10 degrees of dorsiflexion, 40 degrees of plantarflexion, 20 degrees of Eversion, 30 degrees of Inversion; 10 degrees of subtalar motion.\par  Tenderness: improved TTP Medial talus. No tenderness over the lateral malleolus, no CFL/no ATFL/no PTFL pain. No medial malleolus pain, no deltoid ligament pain. No proximal fibular pain. No heel pain.\par  Stability: Negative anterior/posterior drawer.\par  Strength: 5/5 TA/GS/EHL\par  Neuro: In tact to light touch throughout\par  Additional tests: Negative Mortons test, Negative syndesmosis squeeze test. Negative Martha's Sign.\par  \par  *Positive pain improved anterior joint line of the ankle tibiotalar joint and pain along the medial talus to palpation. [de-identified] : No x-rays performed today.

## 2023-08-02 NOTE — ADDENDUM
[FreeTextEntry1] : I, ESTELA TYRONE, acted solely as a scribe for Dr. Flaquito Roe on this date 08/02/2023  .   All medical record entries made by the Scribe were at my, Dr. Flaquito Roe, direction and personally dictated by me on 08/02/2023 . I have reviewed the chart and agree that the record accurately reflects my personal performance of the history, physical exam, assessment and plan. I have also personally directed, reviewed, and agreed with the chart.

## 2023-08-02 NOTE — DISCUSSION/SUMMARY
[de-identified] : Today I had a lengthy discussion with the patient regarding their left ankle pain. I have addressed all the patient's concerns surrounding the pathology of their condition.   The patient is currently 100% temporarily disabled from full duty, until his next reassessment. He will return to work, full duty on September 8th, 2023.   I recommend the patient undergo a course of physical therapy for the left ankle 2-3 times a week for a total of 8-12 weeks. A prescription was renewed for the physical therapy today.   The patient understood and verbally agreed to the treatment plan. All of their questions were answered and they were satisfied with the visit. The patient should call the office if they have any questions or experience worsening symptoms.

## 2023-08-02 NOTE — HISTORY OF PRESENT ILLNESS
[Has the patient missed work because of the injury/illness?] : The patient has missed work because of the injury/illness. [FreeTextEntry1] : 8/2/23: The patient is a 33-year-old male who presents for follow-up evaluation of his left ankle, work-related injury. He reports that he was denied light duty at work. He reports that he is still experiencing mild pain and he is not confident even walking short distances without pain or instability. He presents to the office in sneakers and ambulating without assistance. He is currently not working.   7/24/23: JOSEY GREEN is a 33 year old male who presents for follow up evaluation of left ankle, work related injury. He states that his pain has improved since the last visit. He presents today wearing sneakers and is ambulating without assistance.  He is currently not working.  06/22/2023: The patient is a 33 year old male presenting to the office for a follow up evaluation of a left ankle, work related injury. He notes that the CT scan was denied by Worker's Compensation.  The patient continues with PT which he states is helping with the strength but he still complains of pain left ankle.  Patient is walking in sneakers without assistance.    Patient is not working. Patient is driving.   Pain scale: 4/10  Activities that make the pain better: Rest Ice Heat  Activities that make pain worse: ADL's Lifting heavy objects Chores Work Sleeping - lying down Seating to standing position Walking Stairs  06/01/2023: The patient is a 33 year old male presenting to the office for a follow up evaluation of his left ankle injury, work related accident. He has been compliant with physical therapy with relief. His symptoms have mildly improved since his last visit, but he is still having soreness, stiffness and sharp pain in his ankle. He reports that he has increased pain with inversion and eversion ROM. He presents to the office in sneakers and ambulating without assistance.   Patient is no working. Patient is driving.   4/21/23: The patient is here for a follow-up of his left ankle injury, work-related accident.  The patient continues to use crutches and wear his long cam boot, partial weightbearing.  He does continue to have left ankle pain but states that the pain has improved.  The patient has not started outpatient physical therapy due to the ankle injury and fracture sustained during the injury.  The patient is currently out of work due to his injury.  No other complaints.  Pain scale left ankle 4 out of 10.  Patient not currently driving at this time.  3/31/23: Patient is a 33 year old, right hand dominant male who presents in office status post WRA left ankle on 3/24/2023. The patient works for [employer] and a . The patient states that he fell off a loading dock about 3 feet high. As he was going up the dock, a [place] Almonte lunged at his face. As the dog lunged at his face he let go and rolled his left ankle. He went to  [facility] that night where he obtained XR imaging. He was discharged that same day. He endorses global ankle swelling and pain. He does have some numbness in his toes. He presents in a splint and is ambulating without assistance. No other complaint.  Driving: Not currently driving Working: he is currently out of work at this time.  Pain scale: 8/10  Activities that make the pain better: Rest Ice Heat  Activities that make pain worse: ADL's Lifting heavy objects Chores Work Sleeping - lying down Seating to standing position Walking Stairs

## 2023-12-15 ENCOUNTER — APPOINTMENT (OUTPATIENT)
Dept: ORTHOPEDIC SURGERY | Facility: CLINIC | Age: 33
End: 2023-12-15
Payer: OTHER MISCELLANEOUS

## 2023-12-15 PROCEDURE — 99213 OFFICE O/P EST LOW 20 MIN: CPT

## 2023-12-15 PROCEDURE — 73610 X-RAY EXAM OF ANKLE: CPT | Mod: LT

## 2023-12-15 NOTE — DISCUSSION/SUMMARY
[de-identified] : Patient is currently working full-time without restrictions for [employer].  He will continue to work full-time without restrictions.  When he has minor aches and pains in the ankle, he can continue to use NSAIDs and Tylenol.  He does refer to a ankle compression sleeve versus brace.  All of his questions were answered.  He will follow-up as needed.  The patient understood and verbally agreed to the treatment plan. All of their questions were answered and they were satisfied with the visit. The patient should call the office if they have any questions or experience worsening symptoms.

## 2023-12-15 NOTE — PHYSICAL EXAM
[de-identified] : General: Alert and oriented x3. In no acute distress. Pleasant in nature with a normal affect. No apparent respiratory distress.  Left Ankle Exam Skin: Clean, dry, intact Inspection: No obvious malalignment, mild swelling, no effusion; no lymphadenopathy Pulses: 2+ DP/PT pulses ROM: 10 degrees of dorsiflexion, 40 degrees of plantarflexion, 20 degrees of Eversion, 30 degrees of Inversion; 10 degrees of subtalar motion. Tenderness: improved TTP Medial talus. No tenderness over the lateral malleolus, no CFL/no ATFL/no PTFL pain. No medial malleolus pain, Mild TTP deltoid ligament pain. No proximal fibular pain. No heel pain. Stability: Negative anterior/posterior drawer. Strength: 5/5 TA/GS/EHL Neuro: In tact to light touch throughout Additional tests: Negative Mortons test, Negative syndesmosis squeeze test. Negative Martha's Sign.  *Positive pain improved anterior joint line of the ankle tibiotalar joint and pain along the medial talus to palpation. [de-identified] : 3 views x-rays of the left ankle reviewed: no fractures or abnomalities seen.

## 2023-12-15 NOTE — HISTORY OF PRESENT ILLNESS
[Has the patient missed work because of the injury/illness?] : The patient has missed work because of the injury/illness. [Yes] : The patient is currently working. [Resumed full work: ______] : The patient resumed full work on [unfilled]. [FreeTextEntry1] : 12/15/2023: The patient is a 33-year-old male who presents for follow evaluation of his left ankle, work-related injury.  The patient is currently working full-time for [employer] without restrictions.  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  No locking or catching of the ankle.  No other complaints.  8/2/23: The patient is a 33-year-old male who presents for follow-up evaluation of his left ankle, work-related injury. He reports that he was denied light duty at work. He reports that he is still experiencing mild pain and he is not confident even walking short distances without pain or instability. He presents to the office in sneakers and ambulating without assistance. He is currently not working.   7/24/23: JOSEY GREEN is a 33 year old male who presents for follow up evaluation of left ankle, work related injury. He states that his pain has improved since the last visit. He presents today wearing sneakers and is ambulating without assistance.  He is currently not working.  06/22/2023: The patient is a 33 year old male presenting to the office for a follow up evaluation of a left ankle, work related injury. He notes that the CT scan was denied by Worker's Compensation.  The patient continues with PT which he states is helping with the strength but he still complains of pain left ankle.  Patient is walking in sneakers without assistance.    Patient is not working. Patient is driving.   Pain scale: 4/10  Activities that make the pain better: Rest Ice Heat  Activities that make pain worse: ADL's Lifting heavy objects Chores Work Sleeping - lying down Seating to standing position Walking Stairs  06/01/2023: The patient is a 33 year old male presenting to the office for a follow up evaluation of his left ankle injury, work related accident. He has been compliant with physical therapy with relief. His symptoms have mildly improved since his last visit, but he is still having soreness, stiffness and sharp pain in his ankle. He reports that he has increased pain with inversion and eversion ROM. He presents to the office in sneakers and ambulating without assistance.   Patient is no working. Patient is driving.   4/21/23: The patient is here for a follow-up of his left ankle injury, work-related accident.  The patient continues to use crutches and wear his long cam boot, partial weightbearing.  He does continue to have left ankle pain but states that the pain has improved.  The patient has not started outpatient physical therapy due to the ankle injury and fracture sustained during the injury.  The patient is currently out of work due to his injury.  No other complaints.  Pain scale left ankle 4 out of 10.  Patient not currently driving at this time.  3/31/23: Patient is a 33 year old, right hand dominant male who presents in office status post WRA left ankle on 3/24/2023. The patient works for [employer] and a . The patient states that he fell off a loading dock about 3 feet high. As he was going up the dock, a [place]monte lunged at his face. As the dog lunged at his face he let go and rolled his left ankle. He went to  [facility] that night where he obtained XR imaging. He was discharged that same day. He endorses global ankle swelling and pain. He does have some numbness in his toes. He presents in a splint and is ambulating without assistance. No other complaint.  Driving: Not currently driving Working: he is currently out of work at this time.  Pain scale: 8/10  Activities that make the pain better: Rest Ice Heat  Activities that make pain worse: ADL's Lifting heavy objects Chores Work Sleeping - lying down Seating to standing position Walking Stairs

## 2024-01-29 ENCOUNTER — APPOINTMENT (OUTPATIENT)
Dept: ORTHOPEDIC SURGERY | Facility: CLINIC | Age: 34
End: 2024-01-29
Payer: OTHER MISCELLANEOUS

## 2024-01-29 DIAGNOSIS — S92.122A: ICD-10-CM
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
## 2024-01-29 DIAGNOSIS — S82.52XA DISPLACED FRACTURE OF MEDIAL MALLEOLUS OF LEFT TIBIA, INITIAL ENCOUNTER FOR CLOSED FRACTURE: ICD-10-CM

## 2024-01-29 PROCEDURE — 99213 OFFICE O/P EST LOW 20 MIN: CPT

## 2024-01-29 NOTE — HISTORY OF PRESENT ILLNESS
[Has the patient missed work because of the injury/illness?] : The patient has missed work because of the injury/illness. [Yes] : The patient is currently working. [Resumed full work: ______] : The patient resumed full work on [unfilled]. [FreeTextEntry1] : 1/29/2024: The patient is a 33 zunilda old male who presents for a follow-up evaluation of his left ankle, work-related injury. He is here for his C4.3. He states that his symptoms have remained the same since his previous visit. He presents today wearing sneakers and is ambulating without assistance. He is currently working without restrictions. No other complaints.  12/15/2023: The patient is a 33-year-old male who presents for follow evaluation of his left ankle, work-related injury.  The patient is currently working full-time for [employer] without restrictions.  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  No locking or catching of the ankle.  No other complaints.  8/2/23: The patient is a 33-year-old male who presents for follow-up evaluation of his left ankle, work-related injury. He reports that he was denied light duty at work. He reports that he is still experiencing mild pain and he is not confident even walking short distances without pain or instability. He presents to the office in sneakers and ambulating without assistance. He is currently not working.   7/24/23: JOSEY GREEN is a 33 year old male who presents for follow up evaluation of left ankle, work related injury. He states that his pain has improved since the last visit. He presents today wearing sneakers and is ambulating without assistance.  He is currently not working.  06/22/2023: The patient is a 33 year old male presenting to the office for a follow up evaluation of a left ankle, work related injury. He notes that the CT scan was denied by Worker's Compensation.  The patient continues with PT which he states is helping with the strength but he still complains of pain left ankle.  Patient is walking in sneakers without assistance.    Patient is not working. Patient is driving.   Pain scale: 4/10  Activities that make the pain better: Rest Ice Heat  Activities that make pain worse: ADL's Lifting heavy objects Chores Work Sleeping - lying down Seating to standing position Walking Stairs  06/01/2023: The patient is a 33 year old male presenting to the office for a follow up evaluation of his left ankle injury, work related accident. He has been compliant with physical therapy with relief. His symptoms have mildly improved since his last visit, but he is still having soreness, stiffness and sharp pain in his ankle. He reports that he has increased pain with inversion and eversion ROM. He presents to the office in sneakers and ambulating without assistance.   Patient is no working. Patient is driving.   4/21/23: The patient is here for a follow-up of his left ankle injury, work-related accident.  The patient continues to use crutches and wear his long cam boot, partial weightbearing.  He does continue to have left ankle pain but states that the pain has improved.  The patient has not started outpatient physical therapy due to the ankle injury and fracture sustained during the injury.  The patient is currently out of work due to his injury.  No other complaints.  Pain scale left ankle 4 out of 10.  Patient not currently driving at this time.  3/31/23: Patient is a 33 year old, right hand dominant male who presents in office status post WRA left ankle on 3/24/2023. The patient works for [employer] and a . The patient states that he fell off a loading dock about 3 feet high. As he was going up the dock, a [place]er [place]monte lunged at his face. As the dog lunged at his face he let go and rolled his left ankle. He went to  [facility] that night where he obtained XR imaging. He was discharged that same day. He endorses global ankle swelling and pain. He does have some numbness in his toes. He presents in a splint and is ambulating without assistance. No other complaint.  Driving: Not currently driving Working: he is currently out of work at this time.  Pain scale: 8/10  Activities that make the pain better: Rest Ice Heat  Activities that make pain worse: ADL's Lifting heavy objects Chores Work Sleeping - lying down Seating to standing position Walking Stairs

## 2024-01-29 NOTE — DISCUSSION/SUMMARY
[de-identified] : Today I had a lengthy discussion with the patient regarding their left ankle pain. I have addressed all the patient's concerns surrounding the pathology of their condition.   The patient is currently working full-time without restrictions.  He will remain working full-time without restrictions.  Based on Worker's Compensation guidelines for determining impairment first addition, November 22, 2017; the patient has a loss of use of 37.5% left ankle.  The patient will continue with home exercise and stretches.  NSAIDs and Tylenol as needed for pain.  He can follow-up in office on an as-needed basis.  The patient understood and verbally agreed to the treatment plan. All of their questions were answered and they were satisfied with the visit. The patient should call the office if they have any questions or experience worsening symptoms.

## 2024-01-29 NOTE — PHYSICAL EXAM
[de-identified] : General: Alert and oriented x3. In no acute distress. Pleasant in nature with a normal affect. No apparent respiratory distress.  Left Ankle Exam Skin: Clean, dry, intact Inspection: No obvious malalignment, mild swelling, no effusion; no lymphadenopathy Pulses: 2+ DP/PT pulses ROM: 0 degrees of dorsiflexion, 30 degrees of plantarflexion, 15 degrees of Eversion, 15 degrees of Inversion; 10 degrees of subtalar motion. Tenderness: improved TTP Medial talus. No tenderness over the lateral malleolus, no CFL/no ATFL/no PTFL pain. No medial malleolus pain, Mild TTP deltoid ligament pain. No proximal fibular pain. No heel pain. Stability: Negative anterior/posterior drawer. Strength: 5/5 TA/GS/EHL Neuro: In tact to light touch throughout Additional tests: Negative Mortons test, Negative syndesmosis squeeze test. Negative Martha's Sign.  *Positive pain improved anterior joint line of the ankle tibiotalar joint and pain along the medial talus to palpation. [de-identified] : 3 views x-rays of the left ankle reviewed: no fractures or abnomalities seen.

## 2024-04-01 ENCOUNTER — NON-APPOINTMENT (OUTPATIENT)
Age: 34
End: 2024-04-01

## 2025-05-22 ENCOUNTER — EMERGENCY (EMERGENCY)
Facility: HOSPITAL | Age: 35
LOS: 0 days | Discharge: HOME | End: 2025-05-22
Attending: STUDENT IN AN ORGANIZED HEALTH CARE EDUCATION/TRAINING PROGRAM
Payer: COMMERCIAL

## 2025-05-22 VITALS
RESPIRATION RATE: 20 BRPM | TEMPERATURE: 98 F | HEART RATE: 78 BPM | OXYGEN SATURATION: 100 % | SYSTOLIC BLOOD PRESSURE: 138 MMHG | WEIGHT: 179.9 LBS | DIASTOLIC BLOOD PRESSURE: 95 MMHG

## 2025-05-22 VITALS
HEART RATE: 72 BPM | OXYGEN SATURATION: 100 % | RESPIRATION RATE: 18 BRPM | SYSTOLIC BLOOD PRESSURE: 135 MMHG | TEMPERATURE: 98 F | DIASTOLIC BLOOD PRESSURE: 87 MMHG

## 2025-05-22 DIAGNOSIS — R00.2 PALPITATIONS: ICD-10-CM

## 2025-05-22 DIAGNOSIS — Z86.79 PERSONAL HISTORY OF OTHER DISEASES OF THE CIRCULATORY SYSTEM: ICD-10-CM

## 2025-05-22 DIAGNOSIS — Z87.891 PERSONAL HISTORY OF NICOTINE DEPENDENCE: ICD-10-CM

## 2025-05-22 LAB
ALBUMIN SERPL ELPH-MCNC: 4 G/DL — SIGNIFICANT CHANGE UP (ref 3.3–5)
ALP SERPL-CCNC: 121 U/L — HIGH (ref 40–120)
ALT FLD-CCNC: 40 U/L — SIGNIFICANT CHANGE UP (ref 12–78)
ANION GAP SERPL CALC-SCNC: 2 MMOL/L — LOW (ref 5–17)
AST SERPL-CCNC: 24 U/L — SIGNIFICANT CHANGE UP (ref 15–37)
BASOPHILS # BLD AUTO: 0.02 K/UL — SIGNIFICANT CHANGE UP (ref 0–0.2)
BASOPHILS NFR BLD AUTO: 0.3 % — SIGNIFICANT CHANGE UP (ref 0–2)
BILIRUB SERPL-MCNC: 0.4 MG/DL — SIGNIFICANT CHANGE UP (ref 0.2–1.2)
BUN SERPL-MCNC: 15 MG/DL — SIGNIFICANT CHANGE UP (ref 7–23)
CALCIUM SERPL-MCNC: 9.4 MG/DL — SIGNIFICANT CHANGE UP (ref 8.5–10.1)
CHLORIDE SERPL-SCNC: 108 MMOL/L — SIGNIFICANT CHANGE UP (ref 96–108)
CO2 SERPL-SCNC: 30 MMOL/L — SIGNIFICANT CHANGE UP (ref 22–31)
CREAT SERPL-MCNC: 1 MG/DL — SIGNIFICANT CHANGE UP (ref 0.5–1.3)
EGFR: 101 ML/MIN/1.73M2 — SIGNIFICANT CHANGE UP
EGFR: 101 ML/MIN/1.73M2 — SIGNIFICANT CHANGE UP
EOSINOPHIL # BLD AUTO: 0.06 K/UL — SIGNIFICANT CHANGE UP (ref 0–0.5)
EOSINOPHIL NFR BLD AUTO: 0.8 % — SIGNIFICANT CHANGE UP (ref 0–6)
GLUCOSE SERPL-MCNC: 97 MG/DL — SIGNIFICANT CHANGE UP (ref 70–99)
HCT VFR BLD CALC: 42.1 % — SIGNIFICANT CHANGE UP (ref 39–50)
HGB BLD-MCNC: 14.1 G/DL — SIGNIFICANT CHANGE UP (ref 13–17)
IMM GRANULOCYTES # BLD AUTO: 0.02 K/UL — SIGNIFICANT CHANGE UP (ref 0–0.07)
IMM GRANULOCYTES NFR BLD AUTO: 0.3 % — SIGNIFICANT CHANGE UP (ref 0–0.9)
LYMPHOCYTES # BLD AUTO: 2.87 K/UL — SIGNIFICANT CHANGE UP (ref 1–3.3)
LYMPHOCYTES NFR BLD AUTO: 36 % — SIGNIFICANT CHANGE UP (ref 13–44)
MAGNESIUM SERPL-MCNC: 2.2 MG/DL — SIGNIFICANT CHANGE UP (ref 1.6–2.6)
MCHC RBC-ENTMCNC: 30.6 PG — SIGNIFICANT CHANGE UP (ref 27–34)
MCHC RBC-ENTMCNC: 33.5 G/DL — SIGNIFICANT CHANGE UP (ref 32–36)
MCV RBC AUTO: 91.3 FL — SIGNIFICANT CHANGE UP (ref 80–100)
MONOCYTES # BLD AUTO: 0.63 K/UL — SIGNIFICANT CHANGE UP (ref 0–0.9)
MONOCYTES NFR BLD AUTO: 7.9 % — SIGNIFICANT CHANGE UP (ref 2–14)
NEUTROPHILS # BLD AUTO: 4.37 K/UL — SIGNIFICANT CHANGE UP (ref 1.8–7.4)
NEUTROPHILS NFR BLD AUTO: 54.7 % — SIGNIFICANT CHANGE UP (ref 43–77)
NRBC # BLD AUTO: 0 K/UL — SIGNIFICANT CHANGE UP (ref 0–0)
NRBC # FLD: 0 K/UL — SIGNIFICANT CHANGE UP (ref 0–0)
NRBC BLD AUTO-RTO: 0 /100 WBCS — SIGNIFICANT CHANGE UP (ref 0–0)
PHOSPHATE SERPL-MCNC: 3.1 MG/DL — SIGNIFICANT CHANGE UP (ref 2.5–4.5)
PLATELET # BLD AUTO: 169 K/UL — SIGNIFICANT CHANGE UP (ref 150–400)
PMV BLD: 10.1 FL — SIGNIFICANT CHANGE UP (ref 7–13)
POTASSIUM SERPL-MCNC: 3.8 MMOL/L — SIGNIFICANT CHANGE UP (ref 3.5–5.3)
POTASSIUM SERPL-SCNC: 3.8 MMOL/L — SIGNIFICANT CHANGE UP (ref 3.5–5.3)
PROT SERPL-MCNC: 6.9 GM/DL — SIGNIFICANT CHANGE UP (ref 6–8.3)
RBC # BLD: 4.61 M/UL — SIGNIFICANT CHANGE UP (ref 4.2–5.8)
RBC # FLD: 13.2 % — SIGNIFICANT CHANGE UP (ref 10.3–14.5)
SODIUM SERPL-SCNC: 140 MMOL/L — SIGNIFICANT CHANGE UP (ref 135–145)
TROPONIN I, HIGH SENSITIVITY RESULT: 3.86 NG/L — SIGNIFICANT CHANGE UP
WBC # BLD: 7.97 K/UL — SIGNIFICANT CHANGE UP (ref 3.8–10.5)
WBC # FLD AUTO: 7.97 K/UL — SIGNIFICANT CHANGE UP (ref 3.8–10.5)

## 2025-05-22 PROCEDURE — 93005 ELECTROCARDIOGRAM TRACING: CPT

## 2025-05-22 PROCEDURE — 36000 PLACE NEEDLE IN VEIN: CPT

## 2025-05-22 PROCEDURE — 80053 COMPREHEN METABOLIC PANEL: CPT

## 2025-05-22 PROCEDURE — 84484 ASSAY OF TROPONIN QUANT: CPT

## 2025-05-22 PROCEDURE — 71045 X-RAY EXAM CHEST 1 VIEW: CPT

## 2025-05-22 PROCEDURE — 85025 COMPLETE CBC W/AUTO DIFF WBC: CPT

## 2025-05-22 PROCEDURE — 99285 EMERGENCY DEPT VISIT HI MDM: CPT | Mod: 25

## 2025-05-22 PROCEDURE — 83735 ASSAY OF MAGNESIUM: CPT

## 2025-05-22 PROCEDURE — 93010 ELECTROCARDIOGRAM REPORT: CPT

## 2025-05-22 PROCEDURE — 36415 COLL VENOUS BLD VENIPUNCTURE: CPT

## 2025-05-22 PROCEDURE — 71045 X-RAY EXAM CHEST 1 VIEW: CPT | Mod: 26

## 2025-05-22 PROCEDURE — 99285 EMERGENCY DEPT VISIT HI MDM: CPT

## 2025-05-22 PROCEDURE — 84100 ASSAY OF PHOSPHORUS: CPT

## 2025-05-22 NOTE — ED PROVIDER NOTE - NSICDXPASTMEDICALHX_GEN_ALL_CORE_FT
PAST MEDICAL HISTORY:  Gastroesophageal reflux disease, esophagitis presence not specified     Palpitations     Pulmonary nodule

## 2025-05-22 NOTE — ED PROVIDER NOTE - NSFOLLOWUPINSTRUCTIONS_ED_ALL_ED_FT
Please follow up with you PCP in the next 2 days  Please follow up with cards within the next 2 days    Return to the Emergency Department for worsening or persistent symptoms, and/or ANY NEW OR CONCERNING SYMPTOMS. If you have issues obtaining follow up, please call: [phone] or [phone]  to obtain a doctor or specialist who takes your insurance in your area.    Please return to the ED for any new or worsening problems including but not limited to: fever, chills, headache, chest pain, shortness of breath, palpitations, abdominal pain, nausea, vomiting, diarrhea, numbness or weakness.

## 2025-05-22 NOTE — ED ADULT TRIAGE NOTE - CHIEF COMPLAINT QUOTE
Pt ambulatory c/o palpitations, pt recommended to come to ED by cardiologist MD Basinjosesito if symptoms occurred s/p halter monitor x30 days. Pt reports feeling fluttering in chest and dizziness. Denies PMHx. NKDA. Pt taken fro STAT EKG.

## 2025-05-22 NOTE — ED ADULT NURSE NOTE - NSFALLUNIVINTERV_ED_ALL_ED
Bed/Stretcher in lowest position, wheels locked, appropriate side rails in place/Call bell, personal items and telephone in reach/Instruct patient to call for assistance before getting out of bed/chair/stretcher/Non-slip footwear applied when patient is off stretcher/[place] to call system/Physically safe environment - no spills, clutter or unnecessary equipment/Purposeful proactive rounding/Room/bathroom lighting operational, light cord in reach

## 2025-05-22 NOTE — ED PROVIDER NOTE - PHYSICAL EXAMINATION
General: well appearing, no acute distress, appropriate weight  Head: normocephalic, atraumatic.   Cardiac: heart RRR, no murmurs, rubs, gallops, pulses 2+ x4. chest has no TTP. no LE edema or calf TTP  Pulm: lungs CTA  GI: abdomen soft, nontender, negative rebound, not distended  Skin: warm, dry, no rash, laceration, abrasion, contusion  Psych: anxious

## 2025-05-22 NOTE — ED PROVIDER NOTE - PATIENT PORTAL LINK FT
You can access the FollowMyHealth Patient Portal offered by [facility] by registering at the following website: [email or web address]. By joining [facility]’s FollowMyHealth portal, you will also be able to view your health information using other applications (apps) compatible with our system.

## 2025-05-22 NOTE — ED ADULT NURSE NOTE - OBJECTIVE STATEMENT
pt ambulatory to ed c/o palpitations. onset of symptoms years ago; recently been following with a cardiologist for cardiac workup. pt reports he just got done wearing a 30 day halter monitor but does not remember what the results were. pt denies chest pain/sob. no pmhx. ekg done. piv inserted and blood sent to lab. a& o x4 with no further complaints.

## 2025-05-22 NOTE — ED PROVIDER NOTE - DIFFERENTIAL DIAGNOSIS
Differential Diagnosis DDX includes but not limited to:  palpitations vs arrythmia vs electrolyte abnormality vs anxiety

## 2025-05-22 NOTE — ED PROVIDER NOTE - CLINICAL SUMMARY MEDICAL DECISION MAKING FREE TEXT BOX
patient is a 35 YOM with PMH of palpitations fo r10 years who presents to the ED for palpitations. has been followed by cards recently had a halter monitor for 30 days completed last weekend. admits he was intoxicated at the time the cardiologist called him and does not remember what he said aside from stop drinking which patient states he has done. has report with him showed 1 episode of SVT on 4/21, others were skipped beats or sinus tach. patient used to smoke but has stopped about 3 weeks ago, also used to dip. does not drink caffeine or energy drinks. states the palpitations make him feel light headed. has a stress test scheduled for Tuesday. denies fever, chills, SOB, LE edema or calf  pain.    given hx, low likelihood of serious cardiac events, will obtain labs, EKG, monitor and reeval.